# Patient Record
Sex: MALE | Race: WHITE
[De-identification: names, ages, dates, MRNs, and addresses within clinical notes are randomized per-mention and may not be internally consistent; named-entity substitution may affect disease eponyms.]

---

## 2021-06-20 ENCOUNTER — HOSPITAL ENCOUNTER (EMERGENCY)
Dept: HOSPITAL 61 - ER | Age: 26
Discharge: HOME | End: 2021-06-20
Payer: SELF-PAY

## 2021-06-20 VITALS — WEIGHT: 244.71 LBS | HEIGHT: 67 IN | BODY MASS INDEX: 38.41 KG/M2

## 2021-06-20 VITALS — SYSTOLIC BLOOD PRESSURE: 162 MMHG | DIASTOLIC BLOOD PRESSURE: 91 MMHG

## 2021-06-20 DIAGNOSIS — J45.909: Primary | ICD-10-CM

## 2021-06-20 PROCEDURE — 71046 X-RAY EXAM CHEST 2 VIEWS: CPT

## 2021-06-20 PROCEDURE — 99284 EMERGENCY DEPT VISIT MOD MDM: CPT

## 2021-06-20 PROCEDURE — 94640 AIRWAY INHALATION TREATMENT: CPT

## 2021-06-20 NOTE — PHYS DOC
Past Medical History


Past Medical History:  Asthma


Past Surgical History:  No Surgical History


Smoking Status:  Never Smoker


Alcohol Use:  None





General Adult


EDM:


Chief Complaint:  SHORTNESS OF BREATH





HPI:


HPI:





Patient is a 25  year old male who presents with cough, wheezing, mid chest 

burning the last 2 days.  He states he has had a low-grade fever.  He is fully 

vaccinated for Covid.  He denies abdominal pain, nausea, vomiting, diarrhea, 

headache, dizziness, syncope, back pain.  He does have a history of asthma and 

allergies.  He is not currently taking any allergy medication and he does not 

have an inhaler.





Review of Systems:


Review of Systems:


Constitutional:   +Low grade fever or chills. []


Eyes:   Denies change in visual acuity. []


HENT:   Denies nasal congestion or sore throat. [] 


Respiratory:   + cough or +shortness of breath. +Wheezing [] 


Cardiovascular:   + Mid chest burning or denies edema. [] 


GI:   Denies abdominal pain, nausea, vomiting, bloody stools or diarrhea. [] 


:  Denies dysuria. [] 


Musculoskeletal:   Denies back pain or joint pain. [] 


Integument:   Denies rash. [] 


Neurologic:   Denies headache, focal weakness or sensory changes. [] 


Endocrine:   Denies polyuria or polydipsia. [] 


Lymphatic:  Denies swollen glands. [] 


Psychiatric:  Denies depression or anxiety. []





Heart Score:


C/O Chest Pain:  No


Risk Factors:


Risk Factors:  DM, Current or recent (<one month) smoker, HTN, HLP, family 

history of CAD, obesity.


Risk Scores:


Score 0 - 3:  2.5% MACE over next 6 weeks - Discharge Home


Score 4 - 6:  20.3% MACE over next 6 weeks - Admit for Clinical Observation


Score 7 - 10:  72.7% MACE over next 6 weeks - Early Invasive Strategies





Current Medications:





Current Medications








 Medications


  (Trade)  Dose


 Ordered  Sig/Marilyn  Start Time


 Stop Time Status Last Admin


Dose Admin


 


 Albuterol Sulfate


  (Ventolin Neb


 Soln)  2.5 mg  1X  ONCE  21 12:30


 21 12:31 DC  





 


 Cetirizine HCl


  (ZyrTEC)  10 mg  1X  ONCE  21 12:30


 21 12:31 DC 21 12:32


10 MG


 


 Prednisone


  (Prednisone)  50 mg  1X  ONCE  21 12:30


 21 12:31 DC 21 12:32


50 MG











Allergies:


Allergies:





Allergies








Coded Allergies Type Severity Reaction Last Updated Verified


 


  No Known Drug Allergies    21 No











Physical Exam:


PE:





Constitutional: Well developed, well nourished, no acute distress, non-toxic 

appearance. []


HENT: Normocephalic, atraumatic, bilateral external ears normal, oropharynx 

moist, no oral exudates, nose normal. []


Eyes: PERRLA, EOMI, conjunctiva normal, no discharge. [] 


Neck: Normal range of motion, no tenderness, supple, no stridor. [] 


Cardiovascular:Heart rate regular rhythm, no murmur []


Lungs & Thorax:  Left lung and right lower lobe breath sounds clear and right 

upper inspiratory wheezing to auscultation []


Abdomen: Bowel sounds normal, soft, no tenderness, no masses, no pulsatile 

masses. [] 


Skin: Warm, dry, no erythema, no rash. [] 


Back: No tenderness, no CVA tenderness. [] 


Extremities: No tenderness, no cyanosis, no clubbing, ROM intact, no edema. [] 


Neurologic: Alert and oriented X 3, normal motor function, normal sensory 

function, no focal deficits noted. []


Psychologic: Affect normal, judgement normal, mood normal. []





Current Patient Data:


Vital Signs:





                                   Vital Signs








  Date Time  Temp Pulse Resp B/P (MAP) Pulse Ox O2 Delivery O2 Flow Rate FiO2


 


21 12:00 98.5 105 16 162/91 (114) 97 Room Air  





 98.5       











EKG:


EKG:


[]





Radiology/Procedures:


Radiology/Procedures:


[]


Impression:


                            [facility]


                    [address] [place] [address] [phone]


                                        


                                 IMAGING REPORT





                                     Signed





PATIENT: GIN ORTIZ    ACCOUNT: [id number]     MRN#: [id number]


: 1995           LOCATION: ER              AGE: 25


SEX: M                    EXAM DT: 21         ACCESSION#: [id number]


STATUS: REG ER            ORD. PHYSICIAN: CARMITA CHRISTIANSON


REASON: ASTHMA, WHEEZING


PROCEDURE: CHEST PA & LATERAL





Two-view chest dated 2021 12:31 PM





Comparison: None





CLINICAL INDICATION: Wheezing





FINDINGS:





PA and lateral views obtained. Heart and mediastinal contours within normal 

limits. No consolidation or pleural effusion. No pneumothorax. Prominent 

perihilar linear markings





IMPRESSION:





1. No evidence of focal pneumonia.


2. Mild peribronchial thickening, nonspecific. Consider acute or chronic 

bronchial inflammatory process.





Electronically signed by: Viraj Hernandez MD (2021 12:32 PM) [id number]














DICTATED and SIGNED BY:     VIRAJ HERNANDEZ MD


DATE:     21 [id number]





Course & Med Decision Making:


Course & Med Decision Making


Pertinent Labs and Imaging studies reviewed. (See chart for details)





See HPI.  Inspiratory wheezing is heard on the right upper lung lobe.  Otherwise

 lungs are clear and there is no wheezing.  Patient is given a breathing 

treatment in the ED.  He is given prednisone.  Alert and oriented x4.  Speaks in

 full clear sentences.  Vital signs are within normal limits.  Chest x-ray shows

 bronchitis.  Ambulatory with a steady gait.  Skin pink warm and dry.  Patient 

will be sent home with an inhaler and Medrol Dosepak.





[]





Dragon Disclaimer:


Dragon Disclaimer:


This electronic medical record was generated, in whole or in part, using a voice

 recognition dictation system.





Departure


Departure


Impression:  


   Primary Impression:  


   Bronchitis


Disposition:  01 HOME / SELF CARE / HOMELESS


Condition:  STABLE


Referrals:  


NO PCP (PCP)


Patient Instructions:  Acute Bronchitis





Additional Instructions:  


Follow-up with a primary care physician see as possible.  Take medication as 

prescribed and with food.  Drink plenty of fluids.  Rest.  I would try to stay i

nside for a while especially while you are having a allergy and asthma 

exacerbation.  If anything worsens come back to the emergency room.


Scripts


Methylprednisolone (MEDROL) 4 Mg Tab.ds.pk


1 PKG PO UD, #1 PKG


   Prov: CARMITA CHRISTIANSON APRN         21 


Albuterol Sulfate (PROAIR HFA INHALER) 8.5 Gm Hfa.aer.ad


1 PUFF INH PRN Q4-6HRS PRN for SHORTNESS OF BREATH, #1 EACH 0 Refills


   Prov: CARMITA CHRISTIANSON APRN         21











CARMITA CHRISTIANSON            2021 12:50

## 2021-06-20 NOTE — RAD
Two-view chest dated 6/20/2021 12:31 PM



Comparison: None



CLINICAL INDICATION: Wheezing



FINDINGS:



PA and lateral views obtained. Heart and mediastinal contours within normal limits. No consolidation 
or pleural effusion. No pneumothorax. Prominent perihilar linear markings



IMPRESSION:



1. No evidence of focal pneumonia.

2. Mild peribronchial thickening, nonspecific. Consider acute or chronic bronchial inflammatory proce
ss.



Electronically signed by: Viraj Hernandez MD (6/20/2021 12:32 PM) [id number]